# Patient Record
Sex: MALE | Race: WHITE | URBAN - METROPOLITAN AREA
[De-identification: names, ages, dates, MRNs, and addresses within clinical notes are randomized per-mention and may not be internally consistent; named-entity substitution may affect disease eponyms.]

---

## 2019-07-18 ENCOUNTER — OFFICE VISIT (OUTPATIENT)
Dept: CARDIOLOGY CLINIC | Age: 44
End: 2019-07-18

## 2019-07-18 VITALS
RESPIRATION RATE: 18 BRPM | SYSTOLIC BLOOD PRESSURE: 128 MMHG | HEIGHT: 70 IN | BODY MASS INDEX: 41.29 KG/M2 | HEART RATE: 68 BPM | DIASTOLIC BLOOD PRESSURE: 88 MMHG | WEIGHT: 288.4 LBS

## 2019-07-18 DIAGNOSIS — E66.01 OBESITY, MORBID (HCC): Primary | ICD-10-CM

## 2019-07-18 DIAGNOSIS — E78.1 HYPERTRIGLYCERIDEMIA: ICD-10-CM

## 2019-07-18 DIAGNOSIS — Z99.89 OSA ON CPAP: ICD-10-CM

## 2019-07-18 DIAGNOSIS — G47.33 OSA ON CPAP: ICD-10-CM

## 2019-07-18 DIAGNOSIS — R00.2 PALPITATION: ICD-10-CM

## 2019-07-18 DIAGNOSIS — R07.9 CHEST PAIN, UNSPECIFIED TYPE: ICD-10-CM

## 2019-07-18 NOTE — Clinical Note
7/18/19 Patient: Elian Martell YOB: 1975 Date of Visit: 7/18/2019 Zulay Walker MD 
VIA Dear Zulay Walker MD, Thank you for referring Mr. Elian Martell to [facility] for evaluation. My notes for this consultation are attached. If you have questions, please do not hesitate to call me. I look forward to following your patient along with you.  
 
 
Sincerely, 
 
Morena Edwards MD

## 2019-07-18 NOTE — PROGRESS NOTES
Visit Vitals  /88 (BP 1 Location: Left arm)   Pulse 68   Resp 18   Ht 5' 10\" (1.778 m)   Wt 288 lb 6.4 oz (130.8 kg)   BMI 41.38 kg/m²       New patient referred for chest pain.

## 2019-07-18 NOTE — PROGRESS NOTES
LAST OFFICE VISIT : Visit date not found        ICD-10-CM ICD-9-CM   1. Obesity, morbid ([facility] E66.01 278.01   2. Chest pain, unspecified type R07.9 786.50   3. Palpitation R00.2 785.1   4. Hypertriglyceridemia E78.1 272.1   5. DENNIS on CPAP G47.33 327.23    Z99.89 V46.8            Noemí Walton is a 45 y.o. male     Cardiac risk factors: {hypertrgicleyridemia, questionalenle hx of CAD with mother  I have personally obtained the history from the patient. HISTORY OF PRESENTING ILLNESS     Noemí Walton comes in today for evaluation of chest discomfort and palpitations. He states he last saw a cardiologist 4 years ago and had a negative workup at the time. He says he has noticed palpations at times and SOB that has increased recently. He was seen at [facility] for nausea and diaphoresis. He ws concerned he was having an MI. Workup was negative    The patient denies orthopnea, PND, LE edema, syncope, presyncope or fatigue. ACTIVE PROBLEM LIST     Patient Active Problem List    Diagnosis Date Noted    Obesity, morbid ([facility] 07/18/2019           PAST MEDICAL HISTORY     No past medical history on file. PAST SURGICAL HISTORY     No past surgical history on file. ALLERGIES     No Known Allergies       FAMILY HISTORY     No family history on file. negative for cardiac disease       SOCIAL HISTORY     Social History     Socioeconomic History    Marital status:      Spouse name: Not on file    Number of children: Not on file    Years of education: Not on file    Highest education level: Not on file   Tobacco Use    Smoking status: Former Smoker    Smokeless tobacco: Never Used   Substance and Sexual Activity    Alcohol use: Yes         MEDICATIONS     No current outpatient medications on file. No current facility-administered medications for this visit.         I have reviewed the nurses notes, vitals, problem list, allergy list, medical history, family, social history and medications. REVIEW OF SYMPTOMS      General: Pt denies excessive weight gain or loss. Pt is able to conduct ADL's  HEENT: Denies blurred vision, headaches, hearing loss, epistaxis and difficulty swallowing. Respiratory: Denies cough, congestion, MATTHEWS, wheezing or stridor. +SOB  Cardiovascular: Denies precordial pain, edema or PND +palpitations, chest discomfort  Gastrointestinal: Denies poor appetite, indigestion, abdominal pain or blood in stool  Genitourinary: Denies hematuria, dysuria, increased urinary frequency  Musculoskeletal: Denies joint pain or swelling from muscles or joints  Neurologic: Denies tremor, paresthesias, headache, or sensory motor disturbance  Psychiatric: Denies confusion, insomnia, depression  Integumentray: Denies rash, itching or ulcers. Hematologic: Denies easy bruising, bleeding     PHYSICAL EXAMINATION      Vitals:    07/18/19 1103   BP: 128/88   Pulse: 68   Resp: 18   Weight: 288 lb 6.4 oz (130.8 kg)   Height: 5' 10\" (1.778 m)     General: Well developed, in no acute distress. HEENT: No jaundice, oral mucosa moist, no oral ulcers  Neck: Supple, no stiffness, no lymphadenopathy, supple  Heart:  Normal S1/S2 negative S3 or S4. Regular, no murmur, gallop or rub, no jugular venous distention  Respiratory: Clear bilaterally x 4, no wheezing or rales  Abdomen:   Soft, non-tender, bowel sounds are active.   Extremities:  No edema, normal cap refill, no cyanosis. Musculoskeletal: No clubbing, no deformities  Neuro: A&Ox3, speech clear, gait stable, cooperative, no focal neurologic deficits  Skin: Skin color is normal. No rashes or lesions. Non diaphoretic, moist.  Vascular: 2+ pulses symmetric in all extremities        EKG: SR, inferiror ST-T changes     DIAGNOSTIC DATA     No specialty comments available.          LABORATORY DATA          No results found for: WBC, HGBPOC, HGB, HGBP, HCTPOC, HCT, PHCT, RBCH, PLT, MCV, HGBEXT, HCTEXT, PLTEXT, HGBEXT, HCTEXT, PLTEXT   No results found for: NA, K, CL, CO2, AGAP, GLU, BUN, CREA, BUCR, GFRAA, GFRNA, CA, TBIL, TBILI, GPT, SGOT, AP, TP, ALB, GLOB, AGRAT, ALT        ASSESSMENT/RECOMMENDATIONS:.      1. Nausea/chest discomfort/palpitations  - He is obese with a BMI of 41 and a hx of hypertriglyceridemia. His sxs may be related to MI.  - I favor moving forward with ane cho  - I gave him info on calcium scoring    2. Hypertriglyceridemia  - he has not had is cholesterol checked in a long time    3. DENNIS on CPAP    4. Has not seen a physician and probably needs a HgbA1c check    Return in 6 weeks or PRN    Orders Placed This Encounter    LIPID PANEL    LIVER FUNCTION PANEL          Follow-up and Dispositions  ·   Return in about 6 weeks (around 8/29/2019). I have discussed the diagnosis with  Sania Odom and the intended plan as seen in the above orders. Questions were answered concerning future plans. I have discussed medication side effects and warnings with the patient as well. Thank you,  Shereen Ocasio MD for involving me in the care of  Sania Odom. Please do not hesitate to contact me for further questions/concerns. Written by Nohemy Mendoza, as dictated by Jennifer David MD.      Maykel Vasquez. Jun Galvez MD, [facility]    Patient Care Team:  Shereen Ocasio MD as PCP - General (Family Practice)    [place] [address]      [address] [place] [address], [address] [place] [address] [address]      [phone] / [phone] Fax

## 2019-07-23 DIAGNOSIS — E78.1 HYPERTRIGLYCERIDEMIA: ICD-10-CM

## 2019-07-23 DIAGNOSIS — R00.2 PALPITATION: Primary | ICD-10-CM

## 2019-07-23 LAB
ALBUMIN SERPL-MCNC: 4.4 G/DL (ref 3.5–5.5)
ALP SERPL-CCNC: 74 IU/L (ref 39–117)
ALT SERPL-CCNC: 48 IU/L (ref 0–44)
AST SERPL-CCNC: 30 IU/L (ref 0–40)
BILIRUB DIRECT SERPL-MCNC: 0.12 MG/DL (ref 0–0.4)
BILIRUB SERPL-MCNC: 0.4 MG/DL (ref 0–1.2)
CHOLEST SERPL-MCNC: 205 MG/DL (ref 100–199)
HDLC SERPL-MCNC: 36 MG/DL
INTERPRETATION, 910389: NORMAL
LDLC SERPL CALC-MCNC: 128 MG/DL (ref 0–99)
PROT SERPL-MCNC: 7 G/DL (ref 6–8.5)
TRIGL SERPL-MCNC: 205 MG/DL (ref 0–149)
VLDLC SERPL CALC-MCNC: 41 MG/DL (ref 5–40)

## 2019-08-20 ENCOUNTER — DOCUMENTATION ONLY (OUTPATIENT)
Dept: CARDIOLOGY CLINIC | Age: 44
End: 2019-08-20

## 2019-08-20 NOTE — LETTER
8/21/2019 4:00 PM 
 
Mr. Noemí Walton [address] Dear Noemí Walton, Your recent stress echocardiogram was normal. Good new. Please call if you have any questions [phone]. Sincerely, Cheyenne Farnsworth LPN

## 2019-09-17 ENCOUNTER — OFFICE VISIT (OUTPATIENT)
Dept: CARDIOLOGY CLINIC | Age: 44
End: 2019-09-17

## 2019-09-17 ENCOUNTER — DOCUMENTATION ONLY (OUTPATIENT)
Dept: CARDIOLOGY CLINIC | Age: 44
End: 2019-09-17

## 2019-09-17 VITALS
HEART RATE: 64 BPM | DIASTOLIC BLOOD PRESSURE: 80 MMHG | WEIGHT: 277 LBS | BODY MASS INDEX: 39.65 KG/M2 | HEIGHT: 70 IN | SYSTOLIC BLOOD PRESSURE: 119 MMHG

## 2019-09-17 DIAGNOSIS — E78.5 DYSLIPIDEMIA: Primary | ICD-10-CM

## 2019-09-17 DIAGNOSIS — R07.89 CHEST DISCOMFORT: ICD-10-CM

## 2019-09-17 DIAGNOSIS — E78.5 DYSLIPIDEMIA: ICD-10-CM

## 2019-09-17 NOTE — LETTER
9/17/19 Patient: Fabio Joseph YOB: 1975 Date of Visit: 9/17/2019 Saravanan Fan MD 
[facility] [facility] [address] [facility] [address] [address] VIA Facsimile: [phone] Dear Saravanan Fan MD, Thank you for referring Mr. Poly Sultana to [facility] for evaluation. My notes for this consultation are attached. If you have questions, please do not hesitate to call me. I look forward to following your patient along with you.  
 
 
Sincerely, 
 
Agnes Badillo MD

## 2019-09-17 NOTE — PROGRESS NOTES
LAST OFFICE VISIT : Visit date not found        ICD-10-CM ICD-9-CM   1. Dyslipidemia E78.5 272.4   2. Chest discomfort R07.89 786.59            Layne Diaz is a 45 y.o. male with hypertriglyceridemia and DENNIS referred for follow up. Cardiac risk factors: family history, dyslipidemia, obesity, male gender  I have personally obtained the history from the patient. HISTORY OF PRESENTING ILLNESS     Overall the pt states he is doing well. Pt states that he feels much better and is not having any chest pain or palpitations. He started to eat better and exercising on a stationary bike. Pt states he is sleeping much better. The patient denies chest pain/ shortness of breath, orthopnea, PND, LE edema, palpitations, syncope, presyncope or fatigue. ACTIVE PROBLEM LIST     Patient Active Problem List    Diagnosis Date Noted    Obesity, morbid ([address] 07/18/2019           PAST MEDICAL HISTORY     No past medical history on file. PAST SURGICAL HISTORY     No past surgical history on file. ALLERGIES     Allergies   Allergen Reactions    Other Food Itching     Blue Cheese          FAMILY HISTORY     No family history on file. negative for cardiac disease       SOCIAL HISTORY     Social History     Socioeconomic History    Marital status:      Spouse name: Not on file    Number of children: Not on file    Years of education: Not on file    Highest education level: Not on file   Tobacco Use    Smoking status: Former Smoker    Smokeless tobacco: Never Used   Substance and Sexual Activity    Alcohol use: Yes         MEDICATIONS     No current outpatient medications on file. No current facility-administered medications for this visit. I have reviewed the nurses notes, vitals, problem list, allergy list, medical history, family, social history and medications. REVIEW OF SYMPTOMS      General: Pt denies excessive weight gain or loss.  Pt is able to conduct ADL's  HEENT: Denies blurred vision, headaches, hearing loss, epistaxis and difficulty swallowing. Respiratory: Denies cough, congestion, shortness of breath, MATTHEWS, wheezing or stridor. Cardiovascular: Denies precordial pain, palpitations, edema or PND  Gastrointestinal: Denies poor appetite, indigestion, abdominal pain or blood in stool  Genitourinary: Denies hematuria, dysuria, increased urinary frequency  Musculoskeletal: Denies joint pain or swelling from muscles or joints  Neurologic: Denies tremor, paresthesias, headache, or sensory motor disturbance  Psychiatric: Denies confusion, insomnia, depression  Integumentray: Denies rash, itching or ulcers. Hematologic: Denies easy bruising, bleeding     PHYSICAL EXAMINATION      Vitals:    09/17/19 0957   BP: 119/80   Pulse: 64   Weight: 277 lb (125.6 kg)   Height: 5' 10\" (1.778 m)     General: Well developed, in no acute distress. HEENT: No jaundice, oral mucosa moist, no oral ulcers  Neck: Supple, no stiffness, no lymphadenopathy, supple  Heart:  Normal S1/S2 negative S3 or S4. Regular, no murmur, gallop or rub, no jugular venous distention  Respiratory: Clear bilaterally x 4, no wheezing or rales  Abdomen:   Soft, non-tender, bowel sounds are active. Extremities:  No edema, normal cap refill, no cyanosis. Musculoskeletal: No clubbing, no deformities  Neuro: A&Ox3, speech clear, gait stable, cooperative, no focal neurologic deficits  Skin: Skin color is normal. No rashes or lesions. Non diaphoretic, moist.  Vascular: 2+ pulses symmetric in all extremities           DIAGNOSTIC DATA     1. Stress Test  Stress Echo- 8/19/19- normal, mets 7    2.  Lipids  7/22/19- , HDL 36, ,          LABORATORY DATA            No results found for: WBC, HGBPOC, HGB, HGBP, HCTPOC, HCT, PHCT, RBCH, PLT, MCV, HGBEXT, HCTEXT, PLTEXT, HGBEXT, HCTEXT, PLTEXT, HGBEXT, HCTEXT, PLTEXT   Lab Results   Component Value Date/Time    Bilirubin, total 0.4 07/22/2019 09:56 AM AST (SGOT) 30 07/22/2019 09:56 AM    Alk. phosphatase 74 07/22/2019 09:56 AM    Protein, total 7.0 07/22/2019 09:56 AM    Albumin 4.4 07/22/2019 09:56 AM    ALT (SGPT) 48 (H) 07/22/2019 09:56 AM           ASSESSMENT/RECOMMENDATIONS:.      1. Nausea/chest discomfort/palpitations  - His stress echo was normal and echo pending. Continues to work on risk factor mdifcation and commend him on doing so. He failed to get calcium scoring done. 2. Dyslipidemia  - Last LDL was elevated at 128. Goal should be 100 or below. I do not favor putting him on a statin at this time and want him to try to lower his cholesterol through eating healthy and exercising. Will give him a lab slip to have his cholesterol rechecked in 6 months. - Last HDL was low at 36. Counseled on exercising.     - Last TG was elevated at 205. Counseled on low-carb diet. 3. DENNIS on CPAP  4. Has not seen a physician and probably needs a HGB A1C check  5. Return in 6 months or PRN. Orders Placed This Encounter    LIPID PANEL    HEPATIC FUNCTION PANEL     Standing Status:   Future     Standing Expiration Date:   9/17/2020          Follow-up and Dispositions  ·   Return in about 6 months (around 3/17/2020). I have discussed the diagnosis with  Grant Blackmon and the intended plan as seen in the above orders. Questions were answered concerning future plans. I have discussed medication side effects and warnings with the patient as well. Thank you,  Oscar Abreu MD for involving me in the care of  Grant Blackmon. Please do not hesitate to contact me for further questions/concerns. Written by Armin Beckett, as dictated by Tanner Raines MD.     [place] Pamela Hampton MD, [facility]    Patient Care Team:  Oscar Abreu MD as PCP - General (Family Practice)    [address], [address] [place] [address]     [address]      [phone] / [phone] Fax

## 2019-09-17 NOTE — LETTER
9/23/2019 1:38 PM 
 
Mr. Ramirez Layman [address] Mr. Jake Yung, Your recent echocardiogram showed normal pumping function. Good news.   
 
 
 
 
Sincerely, 
 
 
Mandy Reyez MD

## 2020-01-23 DIAGNOSIS — E78.1 HYPERTRIGLYCERIDEMIA: ICD-10-CM

## 2020-01-23 DIAGNOSIS — R00.2 PALPITATION: ICD-10-CM

## 2020-03-24 LAB
ALBUMIN SERPL-MCNC: 4.7 G/DL (ref 4–5)
ALP SERPL-CCNC: 75 IU/L (ref 39–117)
ALT SERPL-CCNC: 26 IU/L (ref 0–44)
AST SERPL-CCNC: 20 IU/L (ref 0–40)
BILIRUB DIRECT SERPL-MCNC: 0.11 MG/DL (ref 0–0.4)
BILIRUB SERPL-MCNC: 0.4 MG/DL (ref 0–1.2)
CHOLEST SERPL-MCNC: 219 MG/DL (ref 100–199)
HDLC SERPL-MCNC: 42 MG/DL
INTERPRETATION, 910389: NORMAL
LDLC SERPL CALC-MCNC: 140 MG/DL (ref 0–99)
PROT SERPL-MCNC: 7.5 G/DL (ref 6–8.5)
TRIGL SERPL-MCNC: 187 MG/DL (ref 0–149)
VLDLC SERPL CALC-MCNC: 37 MG/DL (ref 5–40)

## 2020-03-24 NOTE — PROGRESS NOTES
Your cholesterol numbers are not at goal. To provide you with the best heart health the LDL should be under 100 if you have no heart disease or history of diabetes. If you have a history of diabetes or heart disease the LDL goal should be less than 70. I would favor you going on simvastatin at 20 mg a day and rechecking your labs in 2 mo. I hope all is well.

## 2020-06-19 ENCOUNTER — OFFICE VISIT (OUTPATIENT)
Dept: CARDIOLOGY CLINIC | Age: 45
End: 2020-06-19

## 2020-06-19 VITALS
HEIGHT: 70 IN | HEART RATE: 72 BPM | WEIGHT: 261 LBS | SYSTOLIC BLOOD PRESSURE: 122 MMHG | OXYGEN SATURATION: 97 % | BODY MASS INDEX: 37.37 KG/M2 | DIASTOLIC BLOOD PRESSURE: 82 MMHG

## 2020-06-19 DIAGNOSIS — Z99.89 OSA ON CPAP: ICD-10-CM

## 2020-06-19 DIAGNOSIS — E66.01 OBESITY, MORBID (HCC): ICD-10-CM

## 2020-06-19 DIAGNOSIS — E78.5 DYSLIPIDEMIA: ICD-10-CM

## 2020-06-19 DIAGNOSIS — G47.33 OSA ON CPAP: ICD-10-CM

## 2020-06-19 DIAGNOSIS — R07.89 CHEST DISCOMFORT: ICD-10-CM

## 2020-06-19 DIAGNOSIS — R00.2 PALPITATION: ICD-10-CM

## 2020-06-19 DIAGNOSIS — E78.5 DYSLIPIDEMIA: Primary | ICD-10-CM

## 2020-06-19 RX ORDER — ASCORBIC ACID 500 MG
TABLET ORAL
COMMUNITY

## 2020-06-19 RX ORDER — MELATONIN
DAILY
COMMUNITY

## 2020-06-19 RX ORDER — SIMVASTATIN 10 MG/1
10 TABLET, FILM COATED ORAL
Qty: 30 TAB | Refills: 5 | Status: SHIPPED | OUTPATIENT
Start: 2020-06-19 | End: 2020-12-18 | Stop reason: SDUPTHER

## 2020-06-19 NOTE — PROGRESS NOTES
Visit Vitals  /82 (BP 1 Location: Left arm, BP Patient Position: Sitting)   Pulse 72   Ht 5' 10\" (1.778 m)   Wt 261 lb (118.4 kg)   SpO2 97%   BMI 37.45 kg/m²         Chest pain: no  Shortness of breath: no  Edema: no  Palpitations: no  Dizziness: no    Will see Gastro on Monday for intestinal issue w/ Vivienne Ku    New diagnosis/Surgeries: no    ER/Hospitalizations:no    Refills: none

## 2020-06-19 NOTE — PROGRESS NOTES
She does help      LAST OFFICE VISIT : Visit date not found        ICD-10-CM ICD-9-CM   1. Dyslipidemia E78.5 272.4   2. DENNIS on CPAP G47.33 327.23    Z99.89 V46.8   3. Chest discomfort R07.89 786.59   4. Palpitation R00.2 785.1   5. Obesity, morbid [facility]) E66.01 278.01            Aylin Rankin is a 39 y.o. male with hypertriglyceridemia and DENNIS referred for follow up. Cardiac risk factors: family history, dyslipidemia, obesity, male gender  I have personally obtained the history from the patient. HISTORY OF PRESENTING ILLNESS     Overall the pt states he is doing well. No cardiac complaints he is working on weight loss and doing a great job. ATTENTION:   This medical record was transcribed using an electronic medical records/speech recognition system. Although proofread, it may and can contain electronic, spelling and other errors. Corrections may be executed at a later time. Please feel free to contact us for any clarifications as needed. ACTIVE PROBLEM LIST     Patient Active Problem List    Diagnosis Date Noted    Obesity, morbid ([address] 07/18/2019           PAST MEDICAL HISTORY     No past medical history on file. PAST SURGICAL HISTORY     No past surgical history on file. ALLERGIES     Allergies   Allergen Reactions    Other Food Itching     Blue Cheese          FAMILY HISTORY     No family history on file. negative for cardiac disease       SOCIAL HISTORY     Social History     Socioeconomic History    Marital status:      Spouse name: Not on file    Number of children: Not on file    Years of education: Not on file    Highest education level: Not on file   Tobacco Use    Smoking status: Former Smoker    Smokeless tobacco: Never Used   Substance and Sexual Activity    Alcohol use: Yes         MEDICATIONS     No current outpatient medications on file. No current facility-administered medications for this visit.         I have reviewed the nurses notes, vitals, problem list, allergy list, medical history, family, social history and medications. REVIEW OF SYMPTOMS      General: Pt denies excessive weight gain or loss. Pt is able to conduct ADL's  HEENT: Denies blurred vision, headaches, hearing loss, epistaxis and difficulty swallowing. Respiratory: Denies cough, congestion, shortness of breath, MATTHEWS, wheezing or stridor. Cardiovascular: Denies precordial pain, palpitations, edema or PND  Gastrointestinal: Denies poor appetite, indigestion, abdominal pain or blood in stool  Genitourinary: Denies hematuria, dysuria, increased urinary frequency  Musculoskeletal: Denies joint pain or swelling from muscles or joints  Neurologic: Denies tremor, paresthesias, headache, or sensory motor disturbance  Psychiatric: Denies confusion, insomnia, depression  Integumentray: Denies rash, itching or ulcers. Hematologic: Denies easy bruising, bleeding     PHYSICAL EXAMINATION      There were no vitals filed for this visit. General: Well developed, in no acute distress. HEENT: No jaundice, oral mucosa moist, no oral ulcers  Neck: Supple, no stiffness, no lymphadenopathy, supple  Heart:  Normal S1/S2 negative S3 or S4. Regular, no murmur, gallop or rub, no jugular venous distention  Respiratory: Clear bilaterally x 4, no wheezing or rales  Abdomen:   Soft, non-tender, bowel sounds are active. Extremities:  No edema, normal cap refill, no cyanosis. Musculoskeletal: No clubbing, no deformities  Neuro: A&Ox3, speech clear, gait stable, cooperative, no focal neurologic deficits  Skin: Skin color is normal. No rashes or lesions. Non diaphoretic, moist.  Vascular: 2+ pulses symmetric in all extremities           DIAGNOSTIC DATA     1. Stress Test  Stress Echo- 8/19/19- normal, mets 7    2. Lipids  7/22/19- , HDL 36, ,   3/23/20- , HDL 42, ,          3.  Echo  9/17/19- EF 56-60%         LABORATORY DATA            No results found for: WBC, HGBPOC, HGB, HGBP, HCTPOC, HCT, PHCT, RBCH, PLT, MCV, HGBEXT, HCTEXT, PLTEXT, HGBEXT, HCTEXT, PLTEXT, HGBEXT, HCTEXT, PLTEXT   Lab Results   Component Value Date/Time    Bilirubin, total 0.4 03/23/2020 12:57 PM    Alk. phosphatase 75 03/23/2020 12:57 PM    Protein, total 7.5 03/23/2020 12:57 PM    Albumin 4.7 03/23/2020 12:57 PM    ALT (SGPT) 26 03/23/2020 12:57 PM           ASSESSMENT/RECOMMENDATIONS:.      1. Dyslipidemia  - Last LDL was elevated at 140 so I favor placing her on Zocor 10 mg a day or return for cholesterol check in 2 months  2. DENNIS on CPAP wears religiously  3. Return in 1 year or PRN. No orders of the defined types were placed in this encounter. Follow-up and Dispositions  ·   Return in about 6 months (around 12/19/2020). I have discussed the diagnosis with  Luis F Figueroa and the intended plan as seen in the above orders. Questions were answered concerning future plans. I have discussed medication side effects and warnings with the patient as well. Thank you,  Pat Thakur MD for involving me in the care of  Luis F Figueroa. Please do not hesitate to contact me for further questions/concerns. Harsha Son MD, [facility]    Patient Care Team:  Pat Thakur MD as PCP - General (Family Practice)  Pat Thakur MD as PCP - [address] [place] [address] [facility]    [address]      [phone] / [phone] Fax

## 2020-06-19 NOTE — PATIENT INSTRUCTIONS
1) Zocor 10 mg a day AKA simvastatin 2) get labs checked at [facility] in 2 mo 3) coenzyme Q 10  200 mg a day

## 2020-11-13 LAB
ALBUMIN SERPL-MCNC: 4.9 G/DL (ref 4–5)
ALP SERPL-CCNC: 82 IU/L (ref 39–117)
ALT SERPL-CCNC: 30 IU/L (ref 0–44)
AST SERPL-CCNC: 19 IU/L (ref 0–40)
BILIRUB DIRECT SERPL-MCNC: 0.11 MG/DL (ref 0–0.4)
BILIRUB SERPL-MCNC: 0.5 MG/DL (ref 0–1.2)
CHOLEST SERPL-MCNC: 187 MG/DL (ref 100–199)
HDLC SERPL-MCNC: 46 MG/DL
INTERPRETATION, 910389: NORMAL
LDLC SERPL CALC-MCNC: 110 MG/DL (ref 0–99)
PROT SERPL-MCNC: 7.5 G/DL (ref 6–8.5)
TRIGL SERPL-MCNC: 179 MG/DL (ref 0–149)
VLDLC SERPL CALC-MCNC: 31 MG/DL (ref 5–40)

## 2020-11-13 NOTE — PROGRESS NOTES
Lipids are close to goal and her LDL came down significantly on Zocor. I think we can continue on the current dose and just with eating healthy and reducing her red meat intake recheck these numbers again in 6 months prior to me increasing the dose. I think you can bring this down on her own in addition to the lower dose of Zocor. If it remains elevated I would increase his Zocor dose to 20 mg.

## 2020-11-30 DIAGNOSIS — E78.5 DYSLIPIDEMIA: Primary | ICD-10-CM

## 2020-12-18 RX ORDER — SIMVASTATIN 10 MG/1
10 TABLET, FILM COATED ORAL
Qty: 30 TAB | Refills: 5 | Status: SHIPPED | OUTPATIENT
Start: 2020-12-18 | End: 2021-06-14 | Stop reason: SDUPTHER

## 2021-06-14 ENCOUNTER — TELEPHONE (OUTPATIENT)
Dept: CARDIOLOGY CLINIC | Age: 46
End: 2021-06-14

## 2021-06-14 RX ORDER — SIMVASTATIN 10 MG/1
10 TABLET, FILM COATED ORAL
Qty: 30 TABLET | Refills: 2 | Status: SHIPPED | OUTPATIENT
Start: 2021-06-14 | End: 2021-10-12

## 2021-06-14 NOTE — TELEPHONE ENCOUNTER
Pt would like a rx refill for his simvastatin because he will be out of his medication when he returns for his annual. Pharmacy verified. Please give him a call back.     Phone [phone]

## 2021-09-01 NOTE — PROGRESS NOTES
CARDIOLOGY OFFICE NOTE    Harsha Singh MD, [address], [place], [address] [facility]  Phone [phone]; Fax [phone]  Mobile [phone]   Voice Mail [phone]      LAST OFFICE VISIT : Visit date not found        ICD-10-CM ICD-9-CM   1. Dyslipidemia  E78.5 272.4   2. DENNIS on CPAP  G47.33 327.23    Z99.89 V46.8   3. Palpitation  R00.2 785.1   4. Obesity, morbid [facility])  E66.01 278.01            Latosha Way is a 42 y.o. male with hypertriglyceridemia and DENNIS referred for follow up. Cardiac risk factors: family history, dyslipidemia, obesity, male gender  I have personally obtained the history from the patient. HISTORY OF PRESENTING ILLNESS      He had a rough time during Covid so he gained weight. He was down to 261 now is up to 281. He has no chest pain or shortness of breath. He failed to get his cholesterol checked and will have to do that soon. ACTIVE PROBLEM LIST     Patient Active Problem List    Diagnosis Date Noted    Obesity, morbid ([facility] [address] 07/18/2019           PAST MEDICAL HISTORY     History reviewed. No pertinent past medical history. PAST SURGICAL HISTORY     History reviewed. No pertinent surgical history. ALLERGIES     Allergies   Allergen Reactions    Other Food Itching     Blue Cheese          FAMILY HISTORY     History reviewed. No pertinent family history.  negative for cardiac disease       SOCIAL HISTORY     Social History     Socioeconomic History    Marital status:      Spouse name: Not on file    Number of children: Not on file    Years of education: Not on file    Highest education level: Not on file   Tobacco Use    Smoking status: Former Smoker    Smokeless tobacco: Never Used   Substance and Sexual Activity    Alcohol use: Yes     Comment: rare    Drug use: Never     Social Determinants of Health     Financial Resource Strain:     Difficulty of Paying Living Expenses:    Food Insecurity:  Worried About [facility] in the Last Year:    [address] in the Last Year:    Transportation Needs:     Lack of Transportation (Medical):  Lack of Transportation (Non-Medical):    Physical Activity:     Days of Exercise per Week:     Minutes of Exercise per Session:    Stress:     Feeling of Stress :    Social Connections:     Frequency of Communication with Friends and Family:     Frequency of Social Gatherings with Friends and Family:     Attends Advent Services:     Active Member of Clubs or Organizations:     Attends Club or Organization Meetings:     Marital Status:          MEDICATIONS     Current Outpatient Medications   Medication Sig    ZINC PO Take  by mouth.  simvastatin (ZOCOR) 10 mg tablet Take 1 Tablet by mouth nightly. Need to keep 9/2/21 appointment    cholecalciferol (Vitamin D3) (1000 Units /25 mcg) tablet Take  by mouth daily.  ascorbic acid, vitamin C, (Vitamin C) 500 mg tablet Take  by mouth. No current facility-administered medications for this visit. I have reviewed the nurses notes, vitals, problem list, allergy list, medical history, family, social history and medications. REVIEW OF SYMPTOMS   Pertinent positive per HPI  General: Pt denies excessive weight gain or loss. Pt is able to conduct ADL's  HEENT: Denies blurred vision, headaches, hearing loss, epistaxis and difficulty swallowing. Respiratory: Denies cough, congestion, shortness of breath, MATTHEWS, wheezing or stridor.   Cardiovascular: Denies precordial pain, palpitations, edema or PND  Gastrointestinal: Denies poor appetite, indigestion, abdominal pain or blood in stool  Genitourinary: Denies hematuria, dysuria, increased urinary frequency  Musculoskeletal: Denies joint pain or swelling from muscles or joints  Neurologic: Denies tremor, paresthesias, headache, or sensory motor disturbance  Psychiatric: Denies confusion, insomnia, depression  Integumentray: Denies rash, itching or ulcers. Hematologic: Denies easy bruising, bleeding     PHYSICAL EXAMINATION      Vitals:    09/02/21 1048   BP: 128/82   Pulse: 72   SpO2: 98%   Weight: 281 lb (127.5 kg)   Height: 5' 10\" (1.778 m)     General: Well developed, in no acute distress. HEENT: No jaundice, oral mucosa moist, no oral ulcers  Neck: Supple, no stiffness, no lymphadenopathy, supple  Heart:  Normal S1/S2 negative S3 or S4. Regular, no murmur, gallop or rub, no jugular venous distention  Respiratory: Clear bilaterally x 4, no wheezing or rales he remains on a lipid-lowering agent but needs to get his cholesterol checked soon  Extremities:  No edema, normal cap refill, no cyanosis. Musculoskeletal: No clubbing, no deformities  Neuro: A&Ox3, speech clear, gait stable, cooperative, no focal neurologic deficits  Skin: Skin color is normal. No rashes or lesions. Non diaphoretic, moist.             DIAGNOSTIC DATA     1. Stress Test  Stress Echo- 8/19/19- normal, mets 7    2. Lipids  7/22/19- , HDL 36, ,   3/23/20- , HDL 42, ,          3. Echo  9/17/19- EF 56-60%         LABORATORY DATA            No results found for: WBC, HGBPOC, HGB, HGBP, HCTPOC, HCT, PHCT, RBCH, PLT, MCV, HGBEXT, HCTEXT, PLTEXT, HGBEXT, HCTEXT, PLTEXT, HGBEXT, HCTEXT, PLTEXT   Lab Results   Component Value Date/Time    Bilirubin, total 0.5 11/12/2020 02:03 PM    Alk. phosphatase 82 11/12/2020 02:03 PM    Protein, total 7.5 11/12/2020 02:03 PM    Albumin 4.9 11/12/2020 02:03 PM    ALT (SGPT) 30 11/12/2020 02:03 PM           ASSESSMENT/RECOMMENDATIONS:.      1. Dyslipidemia  Remains on lipid-lowering agent needs  have his cholesterol checked soon  2. DENNIS on CPAP   -wears r regularly  3. Weight gain   -stressed the importance of weight reduction and he is Motivated to lose weight. 3. Return in 1 year or PRN.      Orders Placed This Encounter    AMB POC EKG ROUTINE W/ 12 LEADS, INTER & REP     Order Specific Question:   Reason for Exam:     Answer:   CHOL    ZINC PO     Sig: Take  by mouth. Follow-up and Dispositions  ·   Return in about 6 months (around 3/2/2022). I have discussed the diagnosis with  Tamie Bashir and the intended plan as seen in the above orders. Questions were answered concerning future plans. I have discussed medication side effects and warnings with the patient as well. Thank you,  Vy Pacheco MD for involving me in the care of  Tamie Bashir. Please do not hesitate to contact me for further questions/concerns. Harsha Ruby MD, [facility] - [place], [place]    Patient Care Team:  Vy Pacheco MD as PCP - General (Family Medicine)  Vy Pacheco MD as PCP - [address] [place] [address] [facility]    [address]      [phone] / [phone] Fax

## 2021-09-02 ENCOUNTER — OFFICE VISIT (OUTPATIENT)
Dept: CARDIOLOGY CLINIC | Age: 46
End: 2021-09-02
Payer: COMMERCIAL

## 2021-09-02 VITALS
OXYGEN SATURATION: 98 % | WEIGHT: 281 LBS | HEART RATE: 72 BPM | HEIGHT: 70 IN | DIASTOLIC BLOOD PRESSURE: 82 MMHG | BODY MASS INDEX: 40.23 KG/M2 | SYSTOLIC BLOOD PRESSURE: 128 MMHG

## 2021-09-02 DIAGNOSIS — E78.5 DYSLIPIDEMIA: Primary | ICD-10-CM

## 2021-09-02 DIAGNOSIS — Z99.89 OSA ON CPAP: ICD-10-CM

## 2021-09-02 DIAGNOSIS — R00.2 PALPITATION: ICD-10-CM

## 2021-09-02 DIAGNOSIS — G47.33 OSA ON CPAP: ICD-10-CM

## 2021-09-02 DIAGNOSIS — E66.01 OBESITY, MORBID (HCC): ICD-10-CM

## 2021-09-02 PROCEDURE — 99214 OFFICE O/P EST MOD 30 MIN: CPT | Performed by: SPECIALIST

## 2021-09-02 PROCEDURE — 93000 ELECTROCARDIOGRAM COMPLETE: CPT | Performed by: SPECIALIST

## 2021-09-02 NOTE — PROGRESS NOTES
Room 5    Did not get labs done in May 2021.      Visit Vitals  /82 (BP 1 Location: Left upper arm, BP Patient Position: Sitting, BP Cuff Size: Large adult)   Pulse 72   Ht 5' 10\" (1.778 m)   Wt 281 lb (127.5 kg)   SpO2 98%   BMI 40.32 kg/m²         Chest pain: no  Shortness of breath: no  Edema: no  Palpitations: no  Dizziness: no    New diagnosis/Surgeries: no    ER/Hospitalizations: JW had a colonoscop, couple polyps, cut out one of them, put a clip, had bleeding     Refills: statin

## 2021-11-19 RX ORDER — SIMVASTATIN 10 MG/1
10 TABLET, FILM COATED ORAL
Qty: 30 TABLET | Refills: 0 | Status: SHIPPED
Start: 2021-11-19 | End: 2021-12-01 | Stop reason: DRUGHIGH

## 2021-11-19 NOTE — TELEPHONE ENCOUNTER
Patient states he was offered to have his simvastatin (ZOCOR) 10 mg tablet prescription changed to a 90 day supply, it was filled at the same 30 day with 0 refills.  Patient is requesting  As 90 day supply sent to     Patient states he ran out of this med last night      [employer] [phone]          [facility][phone]

## 2021-11-23 LAB
ALBUMIN SERPL-MCNC: 4.7 G/DL (ref 4–5)
ALP SERPL-CCNC: 89 IU/L (ref 44–121)
ALT SERPL-CCNC: 31 IU/L (ref 0–44)
AST SERPL-CCNC: 21 IU/L (ref 0–40)
BILIRUB DIRECT SERPL-MCNC: 0.18 MG/DL (ref 0–0.4)
BILIRUB SERPL-MCNC: 0.7 MG/DL (ref 0–1.2)
CHOLEST SERPL-MCNC: 216 MG/DL (ref 100–199)
HDLC SERPL-MCNC: 38 MG/DL
IMP & REVIEW OF LAB RESULTS: NORMAL
LDLC SERPL CALC-MCNC: 135 MG/DL (ref 0–99)
PROT SERPL-MCNC: 7.6 G/DL (ref 6–8.5)
TRIGL SERPL-MCNC: 238 MG/DL (ref 0–149)
VLDLC SERPL CALC-MCNC: 43 MG/DL (ref 5–40)

## 2021-11-27 NOTE — PROGRESS NOTES
Your cholesterol numbers are not at goal. To provide you with the best heart health the LDL should be under 100 if you have no heart disease or history of diabetes. If you have a history of diabetes or heart disease the LDL goal should be less than 70. Your LDL 45. Your LDL or your lousy cholesterol. I would like for you to increase your simvastatin often and 20 mg a day recheck your cholesterol in 2 months.

## 2021-11-29 ENCOUNTER — TELEPHONE (OUTPATIENT)
Dept: CARDIOLOGY CLINIC | Age: 46
End: 2021-11-29

## 2021-11-29 DIAGNOSIS — E78.5 DYSLIPIDEMIA: Primary | ICD-10-CM

## 2021-12-01 RX ORDER — SIMVASTATIN 20 MG/1
TABLET, FILM COATED ORAL
COMMUNITY
End: 2021-12-01 | Stop reason: SDUPTHER

## 2021-12-01 RX ORDER — SIMVASTATIN 20 MG/1
20 TABLET, FILM COATED ORAL
Qty: 90 TABLET | Refills: 3 | Status: SHIPPED | OUTPATIENT
Start: 2021-12-01

## 2022-02-18 LAB
ALBUMIN SERPL-MCNC: 4.7 G/DL (ref 4–5)
ALP SERPL-CCNC: 82 IU/L (ref 44–121)
ALT SERPL-CCNC: 39 IU/L (ref 0–44)
AST SERPL-CCNC: 23 IU/L (ref 0–40)
BILIRUB DIRECT SERPL-MCNC: 0.14 MG/DL (ref 0–0.4)
BILIRUB SERPL-MCNC: 0.5 MG/DL (ref 0–1.2)
CHOLEST SERPL-MCNC: 181 MG/DL (ref 100–199)
HDLC SERPL-MCNC: 38 MG/DL
IMP & REVIEW OF LAB RESULTS: NORMAL
LDLC SERPL CALC-MCNC: 102 MG/DL (ref 0–99)
PROT SERPL-MCNC: 7.6 G/DL (ref 6–8.5)
TRIGL SERPL-MCNC: 239 MG/DL (ref 0–149)
VLDLC SERPL CALC-MCNC: 41 MG/DL (ref 5–40)

## 2022-02-18 NOTE — PROGRESS NOTES
Your cholesterol numbers are not at goal. To provide you with the best heart health the LDL should be under 100 if you have no heart disease or history of diabetes. If you have a history of diabetes or heart disease the LDL goal should be less than 70. Your LDL has come down significantly from 135 to 102. The goal is under 100 but I would not increase her cholesterol medicine as of yet just work on exercise and diet. Your triglycerides were slightly elevated and I reduce my carbohydrate intake.

## 2022-02-22 DIAGNOSIS — E78.5 DYSLIPIDEMIA: Primary | ICD-10-CM

## 2022-03-19 PROBLEM — E66.01 OBESITY, MORBID (HCC): Status: ACTIVE | Noted: 2019-07-18

## 2022-09-08 ENCOUNTER — OFFICE VISIT (OUTPATIENT)
Dept: CARDIOLOGY CLINIC | Age: 47
End: 2022-09-08
Payer: COMMERCIAL

## 2022-09-08 VITALS
HEIGHT: 70 IN | SYSTOLIC BLOOD PRESSURE: 124 MMHG | OXYGEN SATURATION: 98 % | WEIGHT: 287.8 LBS | BODY MASS INDEX: 41.2 KG/M2 | DIASTOLIC BLOOD PRESSURE: 88 MMHG | HEART RATE: 80 BPM

## 2022-09-08 DIAGNOSIS — Z99.89 OSA ON CPAP: ICD-10-CM

## 2022-09-08 DIAGNOSIS — Z00.00 ANNUAL PHYSICAL EXAM: Primary | ICD-10-CM

## 2022-09-08 DIAGNOSIS — E66.01 OBESITY, MORBID (HCC): ICD-10-CM

## 2022-09-08 DIAGNOSIS — R00.2 PALPITATION: ICD-10-CM

## 2022-09-08 DIAGNOSIS — G47.33 OSA ON CPAP: ICD-10-CM

## 2022-09-08 DIAGNOSIS — E78.5 DYSLIPIDEMIA: ICD-10-CM

## 2022-09-08 PROCEDURE — 93000 ELECTROCARDIOGRAM COMPLETE: CPT | Performed by: SPECIALIST

## 2022-09-08 PROCEDURE — 99214 OFFICE O/P EST MOD 30 MIN: CPT | Performed by: SPECIALIST

## 2022-09-08 NOTE — PROGRESS NOTES
CARDIOLOGY OFFICE NOTE    Harsha Reynolds MD, [address], [place], [address] [facility]  Phone [phone]; Fax [phone]  Mobile [phone]   Voice Mail [phone]            ICD-10-CM ICD-9-CM   1. Annual physical exam  Z00.00 V70.0   2. Dyslipidemia  E78.5 272.4   3. DENNIS on CPAP  G47.33 327.23    Z99.89 V46.8   4. Palpitation  R00.2 785.1   5. Obesity, morbid [facility])  E66.01 278.01            Yoel Patterson is a 41 y.o. male with hypertriglyceridemia and DENNIS referred for follow up. Cardiac risk factors: family history, dyslipidemia, obesity, male gender  I have personally obtained the history from the patient. HISTORY OF PRESENTING ILLNESS      Overall he is doing well. He ws without his CPAP for awhile and gaine some weight. All cardiac testing was done in 2019 and was normal. No chest pain or SOB. He was in [place] and walked a lot and had no cardiac sxs. ACTIVE PROBLEM LIST     Patient Active Problem List    Diagnosis Date Noted    Obesity, morbid ([address] 07/18/2019           PAST MEDICAL HISTORY     History reviewed. No pertinent past medical history. PAST SURGICAL HISTORY     History reviewed. No pertinent surgical history. ALLERGIES     Allergies   Allergen Reactions    Other Food Itching     Blue Cheese          FAMILY HISTORY     History reviewed. No pertinent family history. negative for cardiac disease       SOCIAL HISTORY     Social History     Socioeconomic History    Marital status:    Tobacco Use    Smoking status: Former    Smokeless tobacco: Never   Substance and Sexual Activity    Alcohol use: Yes     Comment: rare    Drug use: Never         MEDICATIONS     Current Outpatient Medications   Medication Sig    simvastatin (ZOCOR) 20 mg tablet Take 1 Tablet by mouth nightly. cholecalciferol (VITAMIN D3) (1000 Units /25 mcg) tablet Take  by mouth daily.     ascorbic acid, vitamin C, (VITAMIN C) 500 mg tablet Take  by mouth. ZINC PO Take  by mouth. (Patient not taking: Reported on 9/8/2022)     No current facility-administered medications for this visit. I have reviewed the nurses notes, vitals, problem list, allergy list, medical history, family, social history and medications. REVIEW OF SYMPTOMS   Pertinent positive per HPI  General: Pt denies excessive weight gain or loss. Pt is able to conduct ADL's  HEENT: Denies blurred vision, headaches, hearing loss, epistaxis and difficulty swallowing. Respiratory: Denies cough, congestion, shortness of breath, MATTHEWS, wheezing or stridor. Cardiovascular: Denies precordial pain, palpitations, edema or PND  Gastrointestinal: Denies poor appetite, indigestion, abdominal pain or blood in stool  Genitourinary: Denies hematuria, dysuria, increased urinary frequency  Musculoskeletal: Denies joint pain or swelling from muscles or joints  Neurologic: Denies tremor, paresthesias, headache, or sensory motor disturbance  Psychiatric: Denies confusion, insomnia, depression  Integumentray: Denies rash, itching or ulcers. Hematologic: Denies easy bruising, bleeding     PHYSICAL EXAMINATION      Vitals:    09/08/22 1044   BP: 124/88   Pulse: 80   SpO2: 98%   Weight: 287 lb 12.8 oz (130.5 kg)   Height: 5' 10\" (1.778 m)       General: Well developed, in no acute distress. overweight  HEENT: No jaundice, oral mucosa moist, no oral ulcers  Neck: Supple, no stiffness, no lymphadenopathy, supple  Heart:  Normal S1/S2 negative S3 or S4. Regular, no murmur, gallop or rub, no jugular venous distention  Respiratory: Clear bilaterally x 4, no wheezing or rales   Extremities:  No edema, normal cap refill, no cyanosis. Musculoskeletal: No clubbing, no deformities  Neuro: A&Ox3, speech clear, gait stable, cooperative, no focal neurologic deficits  Skin: Skin color is normal. No rashes or lesions. Non diaphoretic, moist.             DIAGNOSTIC DATA     1.  Stress Test  Stress Echo- 8/19/19- normal, mets 7    2. Lipids  7/22/19- , HDL 36, ,   3/23/20- , HDL 42, ,          3. Echo  9/17/19- EF 56-60%         LABORATORY DATA            No results found for: WBC, HGBPOC, HGB, HGBP, HCTPOC, HCT, PHCT, RBCH, PLT, MCV, HGBEXT, HCTEXT, PLTEXT, HGBEXT, HCTEXT, PLTEXT, HGBEXT, HCTEXT, PLTEXT   Lab Results   Component Value Date/Time    Bilirubin, total 0.5 02/17/2022 11:56 AM    Alk. phosphatase 82 02/17/2022 11:56 AM    Protein, total 7.6 02/17/2022 11:56 AM    Albumin 4.7 02/17/2022 11:56 AM    ALT (SGPT) 39 02/17/2022 11:56 AM           ASSESSMENT/RECOMMENDATIONS:.      1. Dyslipidemia  -LDL is close to gaol. LDL is down to 102  -on simvastatin 10 mg  -update FLP and requisition given to patient  2. DENNIS on CPAP   -wears religiously  Regularly  3. Weight gain /obese with BMI 41 kg/m2  --his weight has gone up since COVID  3. Return in 1 year or PRN. Orders Placed This Encounter    AMB POC EKG ROUTINE W/ 12 LEADS, INTER & REP     Order Specific Question:   Reason for Exam:     Answer:   annual            Follow-up and Dispositions    Return in about 6 months (around 3/8/2023). I have discussed the diagnosis with  Madalyn Merida and the intended plan as seen in the above orders. Questions were answered concerning future plans. I have discussed medication side effects and warnings with the patient as well. Thank you,  Criselda Santiago MD for involving me in the care of  Madalyn Merida. Please do not hesitate to contact me for further questions/concerns. Harsha Servin MD, [facility] [place]    Patient Care Team:  Criselda Santiago MD as PCP - General (Family Medicine)  Criselda Santiago MD as PCP - [facility] [address]      [phone] / [phone] Fax

## 2022-09-08 NOTE — PROGRESS NOTES
Chief Complaint   Patient presents with    Palpitations    Cholesterol Problem    Follow-up     annual       Vitals:    09/08/22 1044   BP: 124/88   Pulse: 80   Height: 5' 10\" (1.778 m)   Weight: 287 lb 12.8 oz (130.5 kg)   SpO2: 98%         Chest pain: no    SOB: no    Palpitations: no    Dizziness: no    Swelling: no    Refills:       Have you been to the ER, urgent care clinic since your last visit? Hospitalized since your last visit? no    Have you sen or consulted other health care providers outside of the [facility] since your last visit?  (Include any pap smears or colon screening.)

## 2022-09-08 NOTE — LETTER
9/8/2022    Patient: Joel Frost   YOB: 1975   Date of Visit: 9/8/2022     Tavares Yanez MD  [address] [place] [phone]  Via Fax: [phone]    Dear Tavares Yanez MD,      Thank you for referring Mr. Lyric Desir to [facility] for evaluation. My notes for this consultation are attached. If you have questions, please do not hesitate to call me. I look forward to following your patient along with you.       Sincerely,    Neena iLndsey MD

## 2022-09-08 NOTE — PATIENT INSTRUCTIONS

## 2022-09-22 LAB
ALBUMIN SERPL-MCNC: 4.6 G/DL (ref 4–5)
ALP SERPL-CCNC: 80 IU/L (ref 44–121)
ALT SERPL-CCNC: 41 IU/L (ref 0–44)
AST SERPL-CCNC: 26 IU/L (ref 0–40)
BILIRUB DIRECT SERPL-MCNC: 0.1 MG/DL (ref 0–0.4)
BILIRUB SERPL-MCNC: 0.3 MG/DL (ref 0–1.2)
CHOLEST SERPL-MCNC: 169 MG/DL (ref 100–199)
HDLC SERPL-MCNC: 35 MG/DL
IMP & REVIEW OF LAB RESULTS: NORMAL
LDLC SERPL CALC-MCNC: 96 MG/DL (ref 0–99)
PROT SERPL-MCNC: 6.9 G/DL (ref 6–8.5)
TRIGL SERPL-MCNC: 220 MG/DL (ref 0–149)
VLDLC SERPL CALC-MCNC: 38 MG/DL (ref 5–40)

## 2022-09-22 NOTE — PROGRESS NOTES
Lipids are at goal. No action needed. Her HDL is low and your triglycerides are high and this is chronic I would just suggest that you exercise and reduce your carbohydrate intake. I would like the cholesterol checked again in 6 mo. Continue to eat healthy and clean.

## 2022-09-29 DIAGNOSIS — E78.5 DYSLIPIDEMIA: Primary | ICD-10-CM

## 2022-12-14 RX ORDER — SIMVASTATIN 20 MG/1
20 TABLET, FILM COATED ORAL
Qty: 90 TABLET | Refills: 3 | Status: SHIPPED | OUTPATIENT
Start: 2022-12-14

## 2022-12-14 NOTE — TELEPHONE ENCOUNTER
Patient is calling to request a refill for Simvastatin 20mg. Please Advise.     Pharmacy Verified     [phone]

## 2023-05-18 RX ORDER — ASCORBIC ACID 500 MG
TABLET ORAL
COMMUNITY

## 2023-05-18 RX ORDER — SIMVASTATIN 20 MG
1 TABLET ORAL NIGHTLY
COMMUNITY
Start: 2022-12-14

## 2023-06-24 LAB
ALBUMIN SERPL-MCNC: 4.6 G/DL (ref 4–5)
ALP SERPL-CCNC: 79 IU/L (ref 44–121)
ALT SERPL-CCNC: 66 IU/L (ref 0–44)
AST SERPL-CCNC: 52 IU/L (ref 0–40)
BILIRUB DIRECT SERPL-MCNC: 0.12 MG/DL (ref 0–0.4)
BILIRUB SERPL-MCNC: 0.5 MG/DL (ref 0–1.2)
CHOLEST SERPL-MCNC: 166 MG/DL (ref 100–199)
HDLC SERPL-MCNC: 35 MG/DL
IMP & REVIEW OF LAB RESULTS: NORMAL
LDLC SERPL CALC-MCNC: 96 MG/DL (ref 0–99)
PROT SERPL-MCNC: 7.3 G/DL (ref 6–8.5)
TRIGL SERPL-MCNC: 203 MG/DL (ref 0–149)
VLDLC SERPL CALC-MCNC: 35 MG/DL (ref 5–40)

## 2023-06-27 DIAGNOSIS — E78.5 HYPERLIPIDEMIA, UNSPECIFIED: Primary | ICD-10-CM

## 2023-06-27 DIAGNOSIS — R74.8 ELEVATED LIVER ENZYMES: Primary | ICD-10-CM

## 2023-08-03 LAB
ALBUMIN SERPL-MCNC: 4.5 G/DL (ref 4.1–5.1)
ALP SERPL-CCNC: 77 IU/L (ref 44–121)
ALT SERPL-CCNC: 62 IU/L (ref 0–44)
AST SERPL-CCNC: 49 IU/L (ref 0–40)
BILIRUB DIRECT SERPL-MCNC: 0.12 MG/DL (ref 0–0.4)
BILIRUB SERPL-MCNC: 0.4 MG/DL (ref 0–1.2)
PROT SERPL-MCNC: 7.3 G/DL (ref 6–8.5)

## 2023-08-29 DIAGNOSIS — R74.8 ELEVATED LIVER ENZYMES: Primary | ICD-10-CM

## 2023-09-12 ENCOUNTER — OFFICE VISIT (OUTPATIENT)
Age: 48
End: 2023-09-12
Payer: COMMERCIAL

## 2023-09-12 VITALS
RESPIRATION RATE: 18 BRPM | HEIGHT: 70 IN | OXYGEN SATURATION: 98 % | SYSTOLIC BLOOD PRESSURE: 118 MMHG | DIASTOLIC BLOOD PRESSURE: 72 MMHG | WEIGHT: 291 LBS | HEART RATE: 84 BPM | BODY MASS INDEX: 41.66 KG/M2

## 2023-09-12 DIAGNOSIS — E78.5 HYPERLIPIDEMIA, UNSPECIFIED HYPERLIPIDEMIA TYPE: Primary | ICD-10-CM

## 2023-09-12 DIAGNOSIS — G47.33 OBSTRUCTIVE SLEEP APNEA (ADULT) (PEDIATRIC): ICD-10-CM

## 2023-09-12 PROCEDURE — 99214 OFFICE O/P EST MOD 30 MIN: CPT

## 2023-09-12 PROCEDURE — 93000 ELECTROCARDIOGRAM COMPLETE: CPT

## 2023-09-12 RX ORDER — SIMVASTATIN 20 MG
20 TABLET ORAL NIGHTLY
Qty: 90 TABLET | Refills: 3 | Status: SHIPPED | OUTPATIENT
Start: 2023-09-12

## 2024-02-09 LAB
ALBUMIN SERPL-MCNC: 4.5 G/DL (ref 4.1–5.1)
ALP SERPL-CCNC: 72 IU/L (ref 44–121)
ALT SERPL-CCNC: 64 IU/L (ref 0–44)
AST SERPL-CCNC: 52 IU/L (ref 0–40)
BILIRUB DIRECT SERPL-MCNC: 0.14 MG/DL (ref 0–0.4)
BILIRUB SERPL-MCNC: 0.6 MG/DL (ref 0–1.2)
PROT SERPL-MCNC: 7.1 G/DL (ref 6–8.5)

## 2024-09-07 LAB
ALBUMIN SERPL-MCNC: 4.5 G/DL (ref 4.1–5.1)
ALP SERPL-CCNC: 83 IU/L (ref 44–121)
ALT SERPL-CCNC: 60 IU/L (ref 0–44)
AST SERPL-CCNC: 50 IU/L (ref 0–40)
BILIRUB DIRECT SERPL-MCNC: 0.18 MG/DL (ref 0–0.4)
BILIRUB SERPL-MCNC: 0.7 MG/DL (ref 0–1.2)
CHOLEST SERPL-MCNC: 157 MG/DL (ref 100–199)
HDLC SERPL-MCNC: 36 MG/DL
IMP & REVIEW OF LAB RESULTS: NORMAL
LDLC SERPL CALC-MCNC: 86 MG/DL (ref 0–99)
PROT SERPL-MCNC: 7.2 G/DL (ref 6–8.5)
TRIGL SERPL-MCNC: 206 MG/DL (ref 0–149)
VLDLC SERPL CALC-MCNC: 35 MG/DL (ref 5–40)

## 2024-09-09 ENCOUNTER — OFFICE VISIT (OUTPATIENT)
Age: 49
End: 2024-09-09
Payer: COMMERCIAL

## 2024-09-09 VITALS
BODY MASS INDEX: 42.8 KG/M2 | HEIGHT: 70 IN | DIASTOLIC BLOOD PRESSURE: 88 MMHG | SYSTOLIC BLOOD PRESSURE: 138 MMHG | OXYGEN SATURATION: 97 % | WEIGHT: 299 LBS | HEART RATE: 79 BPM

## 2024-09-09 DIAGNOSIS — R74.8 ELEVATED LIVER ENZYMES: ICD-10-CM

## 2024-09-09 DIAGNOSIS — E78.5 HYPERLIPIDEMIA, UNSPECIFIED HYPERLIPIDEMIA TYPE: Primary | ICD-10-CM

## 2024-09-09 DIAGNOSIS — E66.01 OBESITY, CLASS III, BMI 40-49.9 (MORBID OBESITY) (HCC): ICD-10-CM

## 2024-09-09 PROCEDURE — 93000 ELECTROCARDIOGRAM COMPLETE: CPT | Performed by: STUDENT IN AN ORGANIZED HEALTH CARE EDUCATION/TRAINING PROGRAM

## 2024-09-09 PROCEDURE — 99214 OFFICE O/P EST MOD 30 MIN: CPT | Performed by: STUDENT IN AN ORGANIZED HEALTH CARE EDUCATION/TRAINING PROGRAM

## 2024-09-09 RX ORDER — ATORVASTATIN CALCIUM 20 MG/1
20 TABLET, FILM COATED ORAL DAILY
Qty: 90 TABLET | Refills: 3 | Status: SHIPPED | OUTPATIENT
Start: 2024-09-09

## 2024-09-28 DIAGNOSIS — E78.5 HYPERLIPIDEMIA, UNSPECIFIED: ICD-10-CM

## 2024-09-28 DIAGNOSIS — R79.89 ELEVATED LFTS: ICD-10-CM

## 2024-10-02 ENCOUNTER — HOSPITAL ENCOUNTER (OUTPATIENT)
Facility: HOSPITAL | Age: 49
Discharge: HOME | End: 2024-10-05
Attending: STUDENT IN AN ORGANIZED HEALTH CARE EDUCATION/TRAINING PROGRAM
Payer: COMMERCIAL

## 2024-10-02 DIAGNOSIS — R74.8 ELEVATED LIVER ENZYMES: ICD-10-CM

## 2024-10-02 PROCEDURE — 76705 ECHO EXAM OF ABDOMEN: CPT

## 2024-10-08 ENCOUNTER — TELEPHONE (OUTPATIENT)
Age: 49
End: 2024-10-08

## 2024-10-08 NOTE — TELEPHONE ENCOUNTER
Per Dr. Reji Cavazos:    Please schedule sooner follow-up patient with me to review his labs and US       I called pt, scheduled him of 11/11/24 @ 1000

## 2024-11-11 ENCOUNTER — OFFICE VISIT (OUTPATIENT)
Age: 49
End: 2024-11-11
Payer: COMMERCIAL

## 2024-11-11 VITALS
WEIGHT: 295 LBS | HEART RATE: 79 BPM | DIASTOLIC BLOOD PRESSURE: 80 MMHG | BODY MASS INDEX: 42.23 KG/M2 | OXYGEN SATURATION: 96 % | HEIGHT: 70 IN | SYSTOLIC BLOOD PRESSURE: 124 MMHG

## 2024-11-11 DIAGNOSIS — E66.01 OBESITY, CLASS III, BMI 40-49.9 (MORBID OBESITY): ICD-10-CM

## 2024-11-11 DIAGNOSIS — E78.5 HYPERLIPIDEMIA, UNSPECIFIED HYPERLIPIDEMIA TYPE: ICD-10-CM

## 2024-11-11 DIAGNOSIS — R74.8 ELEVATED LIVER ENZYMES: Primary | ICD-10-CM

## 2024-11-11 DIAGNOSIS — K75.81 METABOLIC DYSFUNCTION-ASSOCIATED STEATOHEPATITIS (MASH): ICD-10-CM

## 2024-11-11 PROCEDURE — 99213 OFFICE O/P EST LOW 20 MIN: CPT | Performed by: STUDENT IN AN ORGANIZED HEALTH CARE EDUCATION/TRAINING PROGRAM

## 2024-11-11 NOTE — PROGRESS NOTES
Chief Complaint   Patient presents with    CHOL    Elevated Hepatic Enzymes     Vitals:    11/11/24 0949   BP: 124/80   Site: Left Upper Arm   Position: Sitting   Pulse: 79   SpO2: 96%   Weight: 133.8 kg (295 lb)   Height: 1.778 m (5' 10\")       Chest pain NO     ER, urgent care, or hospitalized outside of [facility] since your last visit?  NO     Refills NO   
56-60%                ATTENTION:   This medical record was transcribed using an electronic medical records/speech recognition system.  Although proofread, it may and can contain electronic, spelling and other errors.  Corrections may be executed at a later time.  Please feel free to contact us for any clarifications as needed.

## 2024-11-14 ENCOUNTER — CLINICAL DOCUMENTATION (OUTPATIENT)
Age: 49
End: 2024-11-14

## 2024-11-14 NOTE — PROGRESS NOTES
11/14/2024 1333: New pt referral received. [facility] Dx: Elevated liver enzymes. Records in [facility]. Routine appt

## 2025-01-27 ENCOUNTER — TELEPHONE (OUTPATIENT)
Age: 50
End: 2025-01-27

## 2025-01-27 NOTE — TELEPHONE ENCOUNTER
Patient called in wanting to know if he could be switched to medication zepbound. Patient called in stating that he knows that his insurance will be able to cover this . Patient is requesting to please have an call back for more information .      Patient #~ [phone]

## 2025-04-04 ENCOUNTER — PATIENT MESSAGE (OUTPATIENT)
Age: 50
End: 2025-04-04